# Patient Record
Sex: FEMALE | NOT HISPANIC OR LATINO | Employment: FULL TIME | URBAN - METROPOLITAN AREA
[De-identification: names, ages, dates, MRNs, and addresses within clinical notes are randomized per-mention and may not be internally consistent; named-entity substitution may affect disease eponyms.]

---

## 2017-06-24 ENCOUNTER — OFFICE VISIT (OUTPATIENT)
Dept: URGENT CARE | Facility: PHYSICIAN GROUP | Age: 22
End: 2017-06-24

## 2017-06-24 ENCOUNTER — HOSPITAL ENCOUNTER (OUTPATIENT)
Dept: RADIOLOGY | Facility: MEDICAL CENTER | Age: 22
End: 2017-06-24
Attending: PHYSICIAN ASSISTANT

## 2017-06-24 VITALS
DIASTOLIC BLOOD PRESSURE: 78 MMHG | WEIGHT: 180 LBS | HEIGHT: 60 IN | RESPIRATION RATE: 16 BRPM | TEMPERATURE: 97.9 F | BODY MASS INDEX: 35.34 KG/M2 | HEART RATE: 74 BPM | OXYGEN SATURATION: 98 % | SYSTOLIC BLOOD PRESSURE: 102 MMHG

## 2017-06-24 DIAGNOSIS — S82.51XA CLOSED DISPLACED FRACTURE OF MEDIAL MALLEOLUS OF RIGHT TIBIA, INITIAL ENCOUNTER: ICD-10-CM

## 2017-06-24 DIAGNOSIS — S93.409A SPRAIN OF ANKLE, UNSPECIFIED LATERALITY, UNSPECIFIED LIGAMENT, INITIAL ENCOUNTER: ICD-10-CM

## 2017-06-24 DIAGNOSIS — S93.402A SPRAIN OF LEFT ANKLE, UNSPECIFIED LIGAMENT, INITIAL ENCOUNTER: ICD-10-CM

## 2017-06-24 LAB
INT CON NEG: NEGATIVE
INT CON POS: POSITIVE
POC URINE PREGNANCY TEST: NEGATIVE

## 2017-06-24 PROCEDURE — 81025 URINE PREGNANCY TEST: CPT | Performed by: PHYSICIAN ASSISTANT

## 2017-06-24 PROCEDURE — 99203 OFFICE O/P NEW LOW 30 MIN: CPT | Performed by: PHYSICIAN ASSISTANT

## 2017-06-24 PROCEDURE — 73610 X-RAY EXAM OF ANKLE: CPT | Mod: RT

## 2017-06-24 RX ORDER — HYDROCODONE BITARTRATE AND ACETAMINOPHEN 5; 325 MG/1; MG/1
1 TABLET ORAL EVERY 6 HOURS PRN
Qty: 20 TAB | Refills: 0 | Status: SHIPPED | OUTPATIENT
Start: 2017-06-24

## 2017-06-24 ASSESSMENT — ENCOUNTER SYMPTOMS
CHILLS: 0
FEVER: 0
FOCAL WEAKNESS: 0
SENSORY CHANGE: 0
NAUSEA: 0
VOMITING: 0
TINGLING: 0

## 2017-06-24 NOTE — MR AVS SNAPSHOT
Leslye Vasquez   2017 10:55 AM   Office Visit   MRN: [id number]    Department:  [facility]   Dept Phone:  [phone]    Description:  Female : 1995   Provider:  Levi Oconnell PA-C           Reason for Visit     Foot Problem Rt side, sharp pain, can't put pressure, swollen, bruised, Lt Ankle swollen X 1 day non work related      Allergies as of 2017     Not on File      You were diagnosed with     Closed displaced fracture of medial malleolus of right tibia, initial encounter   [211156]       Sprain of left ankle, unspecified ligament, initial encounter   [[id number]]         Vital Signs     Blood Pressure Pulse Temperature Respirations Height Weight    102/78 mmHg 74 36.6 °C (97.9 °F) 16 1.524 m (5') 81.647 kg (180 lb)    Body Mass Index Oxygen Saturation                35.15 kg/m2 98%          Basic Information     Date Of Birth Sex Race Ethnicity Preferred Language    1995 Female Unable to Obtain Non- English      Health Maintenance     Patient has no pending health maintenance at this time      Results     POCT Pregnancy      Component Value Standard Range & Units    POC Urine Pregnancy Test Negative Negative    Internal Control Positive Positive     Internal Control Negative Negative                         Current Immunizations     No immunizations on file.      Below and/or attached are the medications your provider expects you to take. Review all of your home medications and newly ordered medications with your provider and/or pharmacist. Follow medication instructions as directed by your provider and/or pharmacist. Please keep your medication list with you and share with your provider. Update the information when medications are discontinued, doses are changed, or new medications (including over-the-counter products) are added; and carry medication information at all times in the event of emergency situations     Allergies:  No Known Allergies          Medications   Valid as of: June 24, 2017 -  2:24 PM    Generic Name Brand Name Tablet Size Instructions for use    Hydrocodone-Acetaminophen (Tab) NORCO 5-325 MG Take 1 Tab by mouth every 6 hours as needed.        .                 Medicines prescribed today were sent to:     [id number] - [place], [place] - [address]    [address] [place] [address]    Phone: [phone] Fax: [phone]    Open 24 Hours?: No      Medication refill instructions:       If your prescription bottle indicates you have medication refills left, it is not necessary to call your provider’s office. Please contact your pharmacy and they will refill your medication.    If your prescription bottle indicates you do not have any refills left, you may request refills at any time through one of the following ways: The online Idea.me system (except Urgent Care), by calling your provider’s office, or by asking your pharmacy to contact your provider’s office with a refill request. Medication refills are processed only during regular business hours and may not be available until the next business day. Your provider may request additional information or to have a follow-up visit with you prior to refilling your medication.   *Please Note: Medication refills are assigned a new Rx number when refilled electronically. Your pharmacy may indicate that no refills were authorized even though a new prescription for the same medication is available at the pharmacy. Please request the medicine by name with the pharmacy before contacting your provider for a refill.        Your To Do List     Future Labs/Procedures Complete By Expires    DX-ANKLE 3+ VIEWS LEFT  As directed 6/24/2018    DX-ANKLE 3+ VIEWS RIGHT  As directed 6/24/2018      Referral     A referral request has been sent to our patient care coordination department. Please allow 3-5 business days for us to process this request and contact you either by phone or mail. If you do not hear from us by the  5th business day, please call us at [phone].           Drippler Access Code: [email or web address]  Expires: 7/24/2017  2:24 PM    Your email address is not on file at [facility].  Email Addresses are required for you to sign up for Drippler, please contact [phone] to verify your personal information and to provide your email address prior to attempting to register for Drippler.    Leslye Vasquez  [facility] [email or web address] [address]    Drippler  A secure, online tool to manage your health information     [facility]’s Drippler® is a secure, online tool that connects you to your personalized health information from the privacy of your home -- day or night - making it very easy for you to manage your healthcare. Once the activation process is completed, you can even access your medical information using the Drippler renate, which is available for free in the Apple Renate store or Google Play store.     To learn more about Drippler, visit [email or web address]    There are two levels of access available (as shown below):   My Chart Features  [facility] Primary Care Doctor [facility]  Specialists [facility]  Urgent  Care Non-[facility] Primary Care Doctor   Email your healthcare team securely and privately 24/7 X X X    Manage appointments: schedule your next appointment; view details of past/upcoming appointments X      Request prescription refills. X      View recent personal medical records, including lab and immunizations X X X X   View health record, including health history, allergies, medications X X X X   Read reports about your outpatient visits, procedures, consult and ER notes X X X X   See your discharge summary, which is a recap of your hospital and/or ER visit that includes your diagnosis, lab results, and care plan X X  X     How to register for Drippler:  Once your e-mail address has been verified, follow the following steps to sign up for [employer].     1. Go to  [email or web address]  2. Click on the Sign Up Now  box, which takes you to the New Member Sign Up page. You will need to provide the following information:  a. Enter your [employer] Access Code exactly as it appears at the top of this page. (You will not need to use this code after you’ve completed the sign-up process. If you do not sign up before the expiration date, you must request a new code.)   b. Enter your date of birth.   c. Enter your home email address.   d. Click Submit, and follow the next screen’s instructions.  3. Create a Ziplocal ID. This will be your Ziplocal login ID and cannot be changed, so think of one that is secure and easy to remember.  4. Create a Ziplocal password. You can change your password at any time.  5. Enter your Password Reset Question and Answer. This can be used at a later time if you forget your password.   6. Enter your e-mail address. This allows you to receive e-mail notifications when new information is available in Ziplocal.  7. Click Sign Up. You can now view your health information.    For assistance activating your [employer] account, call [phone]

## 2017-06-24 NOTE — Clinical Note
June 24, 2017       Patient: Leslye Vasquez   YOB: 1995   Date of Visit: 6/24/2017         To Whom It May Concern:    It is my medical opinion that Leslye Vasquez should be excused from work for this coming week, Monday through Friday, due to injury.    If you have any questions or concerns, please don't hesitate to call [phone]          Sincerely,          Levi Oconnell PA-C  Electronically Signed

## 2017-06-24 NOTE — PROGRESS NOTES
Subjective:      Leslye Vasquez is a 21 y.o. female who presents with No chief complaint on file.            Other  Pertinent negatives include no chills, fever, nausea or vomiting.    patient's one day status post fall on a long board while skateboarding. She was going downhill slipped stepped off the board and describes eversion injuries, both right and left ankle. She complains of in her sided pain on right ankle as the primary source of pain. She has been nonweightbearing on right lower extremity since injury. She reports normal sensation and motion to left ankle and foot. Complaining of mild outer sided pain to left foot and ankle with no limitation in very minimal pain. She is more concerned with right ankle where she complains of some limitation of motion secondary to swelling and pain. An sharp pain to inner aspect joint. She denies past medical history of surgery to bilateral ankles. 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    Review of Systems   Constitutional: Negative for fever and chills.   Gastrointestinal: Negative for nausea and vomiting.   Musculoskeletal: Positive for joint pain ( POS for bilat ankle pain R>L).   Neurological: Negative for tingling, sensory change and focal weakness.       PMH:  has no past medical history on file.  MEDS: No current outpatient prescriptions on file.  ALLERGIES: Not on File  SURGHX: No past surgical history on file.  SOCHX:    FH: Family history was reviewed, no pertinent findings to report    I have worn a mask for the entire encounter with this patient.      Objective:     /78 mmHg  Pulse 74  Temp(Src) 36.6 °C (97.9 °F)  Resp 16  Ht 1.524 m (5')  Wt 81.647 kg (180 lb)  BMI 35.15 kg/m2  SpO2 98%     Physical Exam   Constitutional: She is oriented to person, place, and time. She appears well-developed and well-nourished. No distress.   HENT:   Head: Normocephalic and atraumatic.   Right Ear: External ear normal.   Left Ear:  External ear normal.   Nose: Nose normal.   Eyes: Conjunctivae and lids are normal. Right eye exhibits no discharge. Left eye exhibits no discharge. No scleral icterus.   Neck: Neck supple.   Pulmonary/Chest: Effort normal. No respiratory distress.   Musculoskeletal:        Right ankle: She exhibits decreased range of motion ( lacking 10deg ), swelling and ecchymosis ( lat mall). She exhibits no deformity, no laceration and normal pulse. Tenderness. Lateral malleolus and medial malleolus tenderness found. No AITFL, no CF ligament, no posterior TFL, no head of 5th metatarsal and no proximal fibula tenderness found. Achilles tendon normal.        Left ankle: She exhibits swelling ( trace). She exhibits normal range of motion, no ecchymosis, no deformity, no laceration and normal pulse. Tenderness ( more mildly). Lateral malleolus and medial malleolus tenderness found. No AITFL, no CF ligament, no posterior TFL, no head of 5th metatarsal and no proximal fibula tenderness found. Achilles tendon normal.   Neurological: She is alert and oriented to person, place, and time. She is not disoriented. No sensory deficit. Gait ( antalgic, wheel chair to /from lobby) abnormal.   Skin: Skin is warm and dry. She is not diaphoretic. No erythema. No pallor.   Psychiatric: Her speech is normal and behavior is normal.   Nursing note and vitals reviewed.    Dx right -       6/24/2017 11:53 AM    HISTORY/REASON FOR EXAM:  Pain/Deformity Following Trauma  Right ankle pain in swollen after trauma.    TECHNIQUE/EXAM DESCRIPTION AND NUMBER OF VIEWS:  3 views of the RIGHT ankle.    COMPARISON: None.    FINDINGS:  Diffuse soft tissue swelling about the ankle.    There is mildly displaced fracture of the medial malleolus.    No joint arthropathy.         Impression        Mildly displaced fracture of the medial malleolus.         Reading Provider Reading Date     Jonnie Espinoza M.D. Jun 24, 2017         Signing Provider Signing Date Signing  Time     Jonnie Espinoza M.D. Jun 24, 2017 12:00 PM               Assessment/Plan:     1. Closed displaced fracture of medial malleolus of right tibia, initial encounter  Recommend conservative care, rest, ice, elevation, work on gentle ROM exercises, OTC nsaids  Return to clinic with lack of resolution or progression of symptoms. Norco for breakthrough pain.   F/u w/ ortho  Cautioned regarding potential for sedation with medication.      - DX-ANKLE 3+ VIEWS RIGHT; Future  - DX-ANKLE 3+ VIEWS LEFT; Future  - POCT Pregnancy  - REFERRAL TO ORTHOPEDICS  - hydrocodone-acetaminophen (NORCO) 5-325 MG Tab per tablet; Take 1 Tab by mouth every 6 hours as needed.  Dispense: 20 Tab; Refill: 0    2. Sprain of left ankle, unspecified ligament, initial encounter

## 2017-06-24 NOTE — Clinical Note
June 24, 2017       Patient: Leslye Vasquez   YOB: 1995   Date of Visit: 6/24/2017         To Whom It May Concern:    It is my medical opinion that Leslye Vasquez should be excused from work for Monday, Tuesday and Wednesday due to injury.      If you have any questions or concerns, please don't hesitate to call [phone]          Sincerely,          Levi Oconnell PA-C  Electronically Signed

## 2018-05-03 ENCOUNTER — HOSPITAL ENCOUNTER (OUTPATIENT)
Dept: RADIOLOGY | Facility: MEDICAL CENTER | Age: 23
End: 2018-05-03
Attending: PHYSICIAN ASSISTANT
Payer: COMMERCIAL

## 2018-05-03 DIAGNOSIS — N64.59 BREAST SIGNS AND SYMPTOMS: ICD-10-CM

## 2018-05-03 PROCEDURE — 76642 ULTRASOUND BREAST LIMITED: CPT | Mod: LT
